# Patient Record
Sex: FEMALE | Race: AMERICAN INDIAN OR ALASKA NATIVE | NOT HISPANIC OR LATINO | URBAN - METROPOLITAN AREA
[De-identification: names, ages, dates, MRNs, and addresses within clinical notes are randomized per-mention and may not be internally consistent; named-entity substitution may affect disease eponyms.]

---

## 2017-12-02 ENCOUNTER — HOSPITAL ENCOUNTER (EMERGENCY)
Facility: HOSPITAL | Age: 22
Discharge: HOME | End: 2017-12-02
Attending: EMERGENCY MEDICINE
Payer: MEDICAID

## 2017-12-02 VITALS
OXYGEN SATURATION: 98 % | SYSTOLIC BLOOD PRESSURE: 153 MMHG | HEART RATE: 93 BPM | DIASTOLIC BLOOD PRESSURE: 84 MMHG | BODY MASS INDEX: 45.19 KG/M2 | WEIGHT: 287.94 LBS | RESPIRATION RATE: 19 BRPM | HEIGHT: 67 IN | TEMPERATURE: 98 F

## 2017-12-02 DIAGNOSIS — M25.571 ACUTE RIGHT ANKLE PAIN: ICD-10-CM
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
## 2017-12-02 DIAGNOSIS — S93.401A SPRAIN OF RIGHT ANKLE, UNSPECIFIED LIGAMENT, INITIAL ENCOUNTER: Primary | ICD-10-CM

## 2017-12-02 LAB
B-HCG UR QL: NEGATIVE
BILIRUB UR QL STRIP: NEGATIVE
CLARITY UR: CLEAR
COLOR UR: YELLOW
GLUCOSE UR QL STRIP: NEGATIVE
HGB UR QL STRIP: NEGATIVE
KETONES UR QL STRIP: NEGATIVE
LEUKOCYTE ESTERASE UR QL STRIP: ABNORMAL
MICROSCOPIC COMMENT: ABNORMAL
NITRITE UR QL STRIP: NEGATIVE
PH UR STRIP: 8 [PH] (ref 5–8)
PROT UR QL STRIP: NEGATIVE
SP GR UR STRIP: 1.01 (ref 1–1.03)
URN SPEC COLLECT METH UR: ABNORMAL
UROBILINOGEN UR STRIP-ACNC: NEGATIVE EU/DL
WBC #/AREA URNS HPF: 10 /HPF (ref 0–5)

## 2017-12-02 PROCEDURE — 81000 URINALYSIS NONAUTO W/SCOPE: CPT

## 2017-12-02 PROCEDURE — 81025 URINE PREGNANCY TEST: CPT

## 2017-12-02 PROCEDURE — 99284 EMERGENCY DEPT VISIT MOD MDM: CPT | Mod: 25

## 2017-12-02 PROCEDURE — 25000003 PHARM REV CODE 250: Performed by: NURSE PRACTITIONER

## 2017-12-02 PROCEDURE — 29515 APPLICATION SHORT LEG SPLINT: CPT | Mod: RT

## 2017-12-02 RX ORDER — TRAMADOL HYDROCHLORIDE 50 MG/1
50 TABLET ORAL
Status: COMPLETED | OUTPATIENT
Start: 2017-12-02 | End: 2017-12-02

## 2017-12-02 RX ORDER — DICLOFENAC SODIUM 50 MG/1
50 TABLET, DELAYED RELEASE ORAL 3 TIMES DAILY PRN
Qty: 20 TABLET | Refills: 0 | Status: SHIPPED | OUTPATIENT
Start: 2017-12-02

## 2017-12-02 RX ADMIN — TRAMADOL HYDROCHLORIDE 50 MG: 50 TABLET, COATED ORAL at 11:12

## 2017-12-03 NOTE — ED PROVIDER NOTES
SCRIBE #1 NOTE: I, Zainabashir Edwards, am scribing for, and in the presence of, Jt Tanner NP. I have scribed the entire note.      History      Chief Complaint   Patient presents with    Ankle Pain     pt states she fell and twisted R ankle and still having ankle pain       Review of patient's allergies indicates:  No Known Allergies     HPI   HPI    12/2/2017, 10:30 PM   History obtained from the patient      History of Present Illness: Dori Levy is a 22 y.o. female patient who presents to the Emergency Department for right ankle pain which onset gradually today. The pain has been constant and moderate in severity. No mitigating or exacerbating factors reported. Pt states that she slipped and rolled her ankle. No associated sxs. Patient denies extremity weakness/numbness, joint swelling, decreased ROM, paresthesias, ecchymosis, and all other sxs at this time. No further complaints or concerns at this time.       Arrival mode: Personal vehicle    PCP: [facility]       Past Medical History:  Past medical history reviewed not relevant      Past Surgical History:  Past surgical history reviewed not relevant      Family History:  Family history reviewed not relevant      Social History:  Social History reviewed not relevant    Social History Main Topics    Social History Main Topics    Smoking status: Unknown if ever smoked    Smokeless tobacco: Unknown if ever used    Alcohol Use: Unknown drinking history    Drug Use: Unknown if ever used    Sexual Activity: Unknown       ROS   Review of Systems   Constitutional: Negative for fever.   HENT: Negative for sore throat.    Respiratory: Negative for shortness of breath.    Cardiovascular: Negative for chest pain.   Gastrointestinal: Negative for abdominal pain, blood in stool, constipation, diarrhea, nausea and vomiting.   Genitourinary: Negative for decreased urine volume, difficulty urinating, dysuria, flank pain and hematuria.   Musculoskeletal:  Negative for back pain, neck pain and neck stiffness.        (+) right ankle pain   Skin: Negative for rash.   Neurological: Negative for dizziness, weakness, light-headedness, numbness and headaches.   Hematological: Does not bruise/bleed easily.   All other systems reviewed and are negative.      Physical Exam      Initial Vitals [17 2210]   BP Pulse Resp Temp SpO2   124/85 100 20 (!) 110 °F (43.3 °C) 100 %      MAP       98          Physical Exam  Nursing Notes and Vital Signs Reviewed.  Constitutional: Patient is in no acute distress. Awake and alert. Obese. Well-developed and well-nourished.  Head: Atraumatic. Normocephalic.  Eyes: PERRL. EOM intact. Conjunctivae are not pale. No scleral icterus.  ENT: Mucous membranes are moist. Oropharynx is clear and symmetric.    Neck: Supple. Full ROM.   Cardiovascular: Regular rate. Regular rhythm. No murmurs, rubs, or gallops. Distal pulses are 2+ and symmetric.  Pulmonary/Chest: No respiratory distress. Clear to auscultation bilaterally. No wheezing, rales, or rhonchi.  Abdominal: Soft and non-distended.  There is no tenderness.  No rebound, guarding, or rigidity.  Good bowel sounds.    Musculoskeletal: Moves all extremities. No obvious deformities.   Right lateral malleolus tenderness.  Skin: Warm and dry.  Neurological:  Alert, awake, and appropriate.  Normal speech.  No acute focal neurological deficits are appreciated.  Psychiatric: Normal affect. Good eye contact. Appropriate in content.    ED Course    Orthopedic Injury  Date/Time: 2017 11:35 PM  Performed by: KAREN ARRIAZA  Authorized by: KAREN ARRIAZA     Consent Done?:  Yes  Universal Protocol:     Verbal consent obtained?: Yes      Risks and benefits: Risks, benefits and alternatives were discussed      Consent given by:  Patient    Patient identity confirmed:   and name  Injury:     Injury location:  Ankle    Location details:  Right ankle    Injury type:  Soft tissue      Pre-procedure  "assessment:     Neurovascular status: Neurovascularly intact      Distal perfusion: normal      Neurological function: normal      Range of motion: normal      Local anesthesia used?: No      Patient sedated?: No        Selections made in this section will also lock the Injury type section above.:     Immobilization:  Crutches    Splint type: walking boot.    Complications: No      Specimens: No      Implants: No    Post-procedure assessment:     Neurovascular status: Neurovascularly intact      Distal perfusion: normal      Neurological function: normal      Range of motion: unchanged      Patient tolerance:  Patient tolerated the procedure well with no immediate complications      ED Vital Signs:  Vitals:    12/02/17 2210 12/02/17 2352   BP: 124/85 (!) 153/84   Pulse: 100 93   Resp: 20 19   Temp: 98 °F (36.7 °C) 97.9 °F (36.6 °C)   TempSrc: Oral Oral   SpO2: 100% 98%   Weight: 130.6 kg (287 lb 14.7 oz)    Height: 5' 7" (1.702 m)      Results for orders placed or performed during the hospital encounter of 12/02/17   Rapid Pregnancy, Urine   Result Value Ref Range    Preg Test, Ur Negative    Urinalysis Clean Catch   Result Value Ref Range    Specimen UA Urine, Clean Catch     Color, UA Yellow Yellow, Straw, Sarahy    Appearance, UA Clear Clear    pH, UA 8.0 5.0 - 8.0    Specific Gravity, UA 1.015 1.005 - 1.030    Protein, UA Negative Negative    Glucose, UA Negative Negative    Ketones, UA Negative Negative    Bilirubin (UA) Negative Negative    Occult Blood UA Negative Negative    Nitrite, UA Negative Negative    Urobilinogen, UA Negative <2.0 EU/dL    Leukocytes, UA 3+ (A) Negative   Urinalysis Microscopic   Result Value Ref Range    WBC, UA 10 (H) 0 - 5 /hpf    Microscopic Comment SEE COMMENT        Imaging Results:  Imaging Results          X-Ray Ankle Complete Right (Final result)  Result time 12/02/17 22:42:43    Final result by Jaden Ford MD (12/02/17 22:42:43)                 Impression:         Ankle " sprain.        Electronically signed by: MOHINDER LUTZ MD  Date:     12/02/17  Time:    22:42              Narrative:    Exam: XR ANKLE COMPLETE 3 VIEW RIGHT    Clinical History:    Acute right ankle pain. Initial encounter.    Findings:      Periarticular swelling.  Negative for fracture.  Alignment is satisfactory.  The joint spaces are preserved.                               The Emergency Provider reviewed the vital signs and test results, which are outlined above.    ED Discussion     11:34 PM: Reassessed pt at this time. Discussed with pt imaging and lab results. Discussed pt dx and plan of tx. Informed pt to follow up with PCP.  All questions and concerns were addressed at this time. Pt expresses understanding of information and instructions, and is comfortable with plan to discharge. Pt is stable for discharge.    I discussed with patient and/or family/caretaker that negative X-ray does not rule out occult fracture or other soft tissue injury.  Persistent pain greater than 7-10 days or increased pain requires follow up, specifically with orthopedics.       ED Medication(s):  Medications   traMADol tablet 50 mg (50 mg Oral Given 12/2/17 9896)       Discharge Medication List as of 12/2/2017 11:35 PM      START taking these medications    Details   diclofenac (VOLTAREN) 50 MG EC tablet Take 1 tablet (50 mg total) by mouth 3 (three) times daily as needed., Starting Sat 12/2/2017, Print             Follow-up Information     [facility]. Schedule an appointment as soon as possible for a visit in 2 days.    Specialties:  Behavioral Health, Psychiatry, Psychology, Physical Therapy, Occupational Therapy, Speech Pathology  Contact information:  [address] [facility]  [place] [address]  [phone]             [facility] - .    Specialty:  Emergency Medicine  Why:  As needed, If symptoms worsen  Contact information:  [address] [facility] [address] [phone]                   Medical Decision Making    Medical Decision Making:   Clinical Tests:   Lab Tests: Ordered and Reviewed  Radiological Study: Reviewed and Ordered           Scribe Attestation:   Scribe #1: I performed the above scribed service and the documentation accurately describes the services I performed. I attest to the accuracy of the note.    Attending:   Physician Attestation Statement for Scribe #1: I, Jt Tanner NP, personally performed the services described in this documentation, as scribed by Zaina Edwards, in my presence, and it is both accurate and complete.          Clinical Impression       ICD-10-CM ICD-9-CM   1. Sprain of right ankle, unspecified ligament, initial encounter S93.401A 845.00   2. Fall W19.XXXA E888.9   3. Acute right ankle pain M25.571 719.47     338.19       Disposition:   Disposition: Discharged  Condition: Stable           Jt Tanner NP  12/03/17 0109

## 2017-12-27 VITALS
HEIGHT: 66 IN | DIASTOLIC BLOOD PRESSURE: 81 MMHG | WEIGHT: 290.38 LBS | TEMPERATURE: 98 F | BODY MASS INDEX: 46.67 KG/M2 | HEART RATE: 81 BPM | OXYGEN SATURATION: 98 % | RESPIRATION RATE: 18 BRPM | SYSTOLIC BLOOD PRESSURE: 143 MMHG

## 2017-12-27 PROCEDURE — 99283 EMERGENCY DEPT VISIT LOW MDM: CPT

## 2017-12-28 ENCOUNTER — HOSPITAL ENCOUNTER (EMERGENCY)
Facility: HOSPITAL | Age: 22
Discharge: HOME | End: 2017-12-28
Payer: MEDICAID

## 2017-12-28 DIAGNOSIS — L40.9 PSORIASIS: Primary | ICD-10-CM

## 2017-12-28 DIAGNOSIS — R21 RASH: ICD-10-CM

## 2017-12-28 RX ORDER — TACROLIMUS 0.3 MG/G
OINTMENT TOPICAL 2 TIMES DAILY
Qty: 60 G | Refills: 0 | Status: SHIPPED | OUTPATIENT
Start: 2017-12-28

## 2017-12-28 RX ORDER — METHYLPREDNISOLONE 4 MG/1
TABLET ORAL
Qty: 1 PACKAGE | Refills: 0 | Status: SHIPPED | OUTPATIENT
Start: 2017-12-28 | End: 2018-01-18

## 2017-12-28 RX ORDER — TRIAMCINOLONE ACETONIDE 1 MG/G
CREAM TOPICAL 2 TIMES DAILY
Qty: 1 TUBE | Refills: 0 | Status: SHIPPED | OUTPATIENT
Start: 2017-12-28 | End: 2018-01-07

## 2017-12-28 NOTE — ED PROVIDER NOTES
HISTORY     Chief Complaint   Patient presents with    Rash     Pt reports hx psoriasis. Reports flare-up.     Review of patient's allergies indicates:  No Known Allergies     HPI   The history is provided by the patient.   Rash    This is a recurrent problem. The problem has been waxing and waning. The rash is present on the face, right upper leg, left upper leg and trunk (sternum, under bilateral breast ). Associated symptoms include itching. She has tried nothing for the symptoms. The treatment provided no relief.        PCP: [facility]     Past Medical History:  No past medical history on file.     Past Surgical History:  No past surgical history on file.     Family History:  No family history on file.     Social History:  Social History     Social History Main Topics    Smoking status: Not on file    Smokeless tobacco: Not on file    Alcohol use Not on file    Drug use: Unknown    Sexual activity: Not on file         ROS   Review of Systems   Constitutional: Negative for fever.   HENT: Negative for sore throat.    Respiratory: Negative for shortness of breath.    Cardiovascular: Negative for chest pain.   Gastrointestinal: Negative for nausea.   Genitourinary: Negative for dysuria.   Musculoskeletal: Negative for back pain.   Skin: Positive for itching and rash.   Neurological: Negative for weakness.   Hematological: Does not bruise/bleed easily.       PHYSICAL EXAM     Initial Vitals [12/27/17 2357]   BP Pulse Resp Temp SpO2   (!) 143/81 81 18 98 °F (36.7 °C) 98 %      MAP       101.67           Physical Exam    Constitutional: She appears well-developed and well-nourished. No distress.   HENT:   Head: Normocephalic and atraumatic.   Eyes: Conjunctivae are normal. Pupils are equal, round, and reactive to light.   Neck: Normal range of motion. Neck supple.   Cardiovascular: Normal rate, regular rhythm and normal heart sounds.   Pulmonary/Chest: Breath sounds normal.   Abdominal:  "Soft. Bowel sounds are normal. She exhibits no distension. There is no tenderness. There is no rebound.   Musculoskeletal: Normal range of motion. She exhibits no edema.   Neurological: She is alert and oriented to person, place, and time. She has normal strength.   Skin: Skin is warm and dry. Rash noted.   Patient has a history of psoriasis. She is having a flare. The worse spots is at hairline, bilateral elbows, bilateral knees, and sternum.    Psychiatric: She has a normal mood and affect.          ED COURSE   Procedures  ED ONGOING VITALS:  Vitals:    12/27/17 2357   BP: (!) 143/81   Pulse: 81   Resp: 18   Temp: 98 °F (36.7 °C)   TempSrc: Oral   SpO2: 98%   Weight: 131.7 kg (290 lb 5.5 oz)   Height: 5' 6" (1.676 m)         ABNORMAL LAB VALUES:  Labs Reviewed - No data to display      ALL LAB VALUES:      RADIOLOGY STUDIES:  Imaging Results    None                   The above vital signs and test results have been reviewed by the emergency provider.     ED Medications:  Medications - No data to display    Discharge Medications:  Discharge Medication List as of 12/28/2017 12:24 AM      START taking these medications    Details   methylPREDNISolone (MEDROL DOSEPACK) 4 mg tablet As directed, Print      tacrolimus (PROTOPIC) 0.03 % ointment Apply topically 2 (two) times daily., Starting Thu 12/28/2017, Print      triamcinolone acetonide 0.1% (KENALOG) 0.1 % cream Apply topically 2 (two) times daily., Starting Thu 12/28/2017, Until Sun 1/7/2018, Print            Follow-up Information     [facility]. Schedule an appointment as soon as possible for a visit in 2 days.    Specialties:  Behavioral Health, Psychiatry, Psychology, Physical Therapy, Occupational Therapy, Speech Pathology  Contact information:  [address] [facility]  [place] [address]  [phone]             [facility] - .    Specialty:  Emergency Medicine  Why:  As needed, If symptoms worsen  Contact information:  [address] [facility] [id number] [phone]           Schedule an appointment as soon as possible for a visit  with Dermatology.    Specialty:  Dermatology  Contact information:  [address] [facility] [address] [phone]                I discussed with patient and/or family/caretaker that evaluation in the ED does not suggest any emergent or life threatening medical conditions requiring immediate intervention beyond what was provided in the ED, and I believe patient is safe for discharge. Regardless, an unremarkable evaluation in the ED does not preclude the development or presence of a serious or life threatening condition. As such, patient was instructed to return immediately for any worsening or change in current symptoms.          MEDICAL DECISION MAKING                 CLINICAL IMPRESSION       ICD-10-CM ICD-9-CM   1. Psoriasis L40.9 696.1   2. Rash R21 782.1       Disposition:   Disposition: Discharged  Condition: Stable         Jt Tanner NP  12/28/17 0049